# Patient Record
Sex: FEMALE | Race: WHITE | NOT HISPANIC OR LATINO | URBAN - METROPOLITAN AREA
[De-identification: names, ages, dates, MRNs, and addresses within clinical notes are randomized per-mention and may not be internally consistent; named-entity substitution may affect disease eponyms.]

---

## 2022-01-31 ENCOUNTER — OFFICE (OUTPATIENT)
Dept: URBAN - METROPOLITAN AREA CLINIC 72 | Facility: CLINIC | Age: 77
End: 2022-01-31

## 2022-01-31 VITALS
SYSTOLIC BLOOD PRESSURE: 134 MMHG | DIASTOLIC BLOOD PRESSURE: 78 MMHG | HEIGHT: 64 IN | WEIGHT: 187 LBS | HEART RATE: 68 BPM | OXYGEN SATURATION: 99 %

## 2022-01-31 DIAGNOSIS — D75.89 OTHER SPECIFIED DISEASES OF BLOOD AND BLOOD-FORMING ORGANS: ICD-10-CM

## 2022-01-31 PROCEDURE — 99213 OFFICE O/P EST LOW 20 MIN: CPT | Performed by: INTERNAL MEDICINE

## 2022-01-31 NOTE — SERVICEHPINOTES
Nalini Evans   is seen today for a follow-up visit.  
br
br76 year old female previously seen by Dr. Lafleur who I saw in the hospital for large gastric ulcerpatient was admitted 8/9 with large antral gastric ulcer. Hgb dopped to a low of 5.9. EGD performed 8/8 by Thiago with large pre-pyloric ulcer.  She had angio as well with no targets.  No endoscopic or angio treatment given.She called after the hospitalization and noted weight gain and diarrhea.  We got a stool sample for C dff that was negative.brIn terms of the excess water weight she went to see her pcp and her BP meds were adjusted to include a water pill and have improved.brIn terms of the diarrhea it is back to normal.brShe is not having pain, no black stool.brShe is taking protonix twice a day. No side effects from that medication.      br  Plan from last visit:
br
- I would recommend colonoscopy at age 77br- continue pantoprazole 40 mg twice a day for a month totalbr- then start pantoprazole 40 mg once a day which you will stay on till EGDbr- If it is healed nicely on EGD we will decrease to 20 mg once ad aybr- labs todaybr- avoid NSAIDS, I fyou need to be on it then let me know and we may have to adjust medications Interval History:  1/31/2022   
br   Follow up EGD with healing but some narrowing and small erosion. I recommended continue 40 a day for 8 weeks then decrease to 20. She is doing well, no abdominal pain. no acid reflux.  br 
Folow up labs showed neg HP, macrocytic anemia (, hgb 11) she did not follow up for folate and b12 that I ordered
br BM are normal. brMy nurse has reviewed and updated the medication list with the patient (medication reconciliation). I have also reviewed the medication list. New updates were made to the patient's medical, social and family history. Pertinent details are also noted above in the HPI.br visited="true"

## 2022-01-31 NOTE — SERVICENOTES
Our goal is to partner with you to improve your health and well being. It is important for you to complete necessary testing and follow the instructions given to you at your clinic visit. Our office will call you within 2 weeks with results of any testing but you may also call sooner to obtain results [phone].   If you have any questions or concerns please feel free to call.  We take your care very seriously and we thank you for your trust!
- I would like you to have b12 and folate drawn because of macrocytosis, you can have this done with Dr. Alvarez or if needed to do here just let us know
- you are due for your colonoscopy 5/2023, if you don't hear from us by 5/2023 please let us know
- continue the pantoprazole 20 mg a day
- , Follow up as needed if symptoms are not improving or you have new or concerning symptoms.

## 2022-10-13 ENCOUNTER — OFFICE (OUTPATIENT)
Dept: URBAN - METROPOLITAN AREA CLINIC 72 | Facility: CLINIC | Age: 77
End: 2022-10-13

## 2022-10-13 VITALS
SYSTOLIC BLOOD PRESSURE: 122 MMHG | HEIGHT: 64 IN | WEIGHT: 188 LBS | HEART RATE: 75 BPM | OXYGEN SATURATION: 96 % | DIASTOLIC BLOOD PRESSURE: 70 MMHG

## 2022-10-13 DIAGNOSIS — Z86.010 PERSONAL HISTORY OF COLONIC POLYPS: ICD-10-CM

## 2022-10-13 DIAGNOSIS — Z87.11 PERSONAL HISTORY OF PEPTIC ULCER DISEASE: ICD-10-CM

## 2022-10-13 DIAGNOSIS — D53.9 NUTRITIONAL ANEMIA, UNSPECIFIED: ICD-10-CM

## 2022-10-13 PROCEDURE — 99214 OFFICE O/P EST MOD 30 MIN: CPT | Performed by: INTERNAL MEDICINE

## 2022-10-13 NOTE — SERVICENOTES
Our goal is to partner with you to improve your health and well being. It is important for you to complete necessary testing and follow the instructions given to you at your clinic visit. Our office will call you within 2 weeks with results of any testing but you may also call sooner to obtain results [phone].   If you have any questions or concerns please feel free to call.  We take your care very seriously and we thank you for your trust!
- get labs
- we will wait list you for your colonoscopy 5/2023. no need for repeat office visit prior to that unless symptoms or medical history changes.  The office will just call you to get scheduled.  If you don't hear from us by 4/2023 please reach out.    Depending on labs we may need EGD as well (or to do it sooner)

## 2022-10-13 NOTE — SERVICEHPINOTES
Nalini Evans   is seen today for a follow-up visit.  
br
br77 year old female previously seen by Dr. Lafleur who I saw in the hospital for large gastric ulcerpatient was admitted 8/9 with large antral gastric ulcer. Hgb dopped to a low of 5.9. EGD performed 8/8 by Thiago with large pre-pyloric ulcer. She had angio as well with no targets. No endoscopic or angio treatment given.She called after the hospitalization and noted weight gain and diarrhea. We got a stool sample for C dff that was negative.brIn terms of the excess water weight she went to see her pcp and her BP meds were adjusted to include a water pill and have improved.brIn terms of the diarrhea it is back to normal.brShe is not having pain, no black stool.brShe is taking protonix twice a day. No side effects from that medication. Interval History:1/31/2022brFollow up EGD with healing but some narrowing and small erosion. I recommended continue 40 a day for 8 weeks then decrease to 20. She is doing well, no abdominal pain. no acid reflux. brFolow up labs showed neg HP, macrocytic anemia (, hgb 11) she did not follow up for folate and b12 that I orderedbrBM are normal. br    br  Plan from last visit:
br
br I would like you to have b12 and folate drawn because of macrocytosis, you can have this done with Dr. Alvarez or if needed to do here just let us knowbr- you are due for your colonoscopy 5/2023, if you don't hear from us by 5/2023 please let us knowbr- continue the pantoprazole 20 mg a daybr- , Follow up as needed if symptoms are not improving or you have new or concerning symptoms. Interval History:  10/13/2022   
br   I did nto get lab results.
br She is on pantoprazole 20 mg and doing well. 
br she feels great.  no heartburn or abdominal pain.  no blood in the stool or black stool. br She does not drink significant alcohol.  My nurse has reviewed and updated the medication list with the patient (medication reconciliation). I have also reviewed the medication list. New updates were made to the patient's medical, social and family history. Pertinent details are also noted above in the HPI.br visited="true"

## 2023-04-21 ENCOUNTER — OFFICE (OUTPATIENT)
Dept: URBAN - METROPOLITAN AREA CLINIC 72 | Facility: CLINIC | Age: 78
End: 2023-04-21

## 2023-04-21 VITALS
WEIGHT: 194 LBS | HEIGHT: 64 IN | HEART RATE: 72 BPM | OXYGEN SATURATION: 95 % | DIASTOLIC BLOOD PRESSURE: 74 MMHG | SYSTOLIC BLOOD PRESSURE: 132 MMHG

## 2023-04-21 DIAGNOSIS — R79.89 OTHER SPECIFIED ABNORMAL FINDINGS OF BLOOD CHEMISTRY: ICD-10-CM

## 2023-04-21 DIAGNOSIS — Z86.010 PERSONAL HISTORY OF COLONIC POLYPS: ICD-10-CM

## 2023-04-21 DIAGNOSIS — D53.9 NUTRITIONAL ANEMIA, UNSPECIFIED: ICD-10-CM

## 2023-04-21 PROCEDURE — 99214 OFFICE O/P EST MOD 30 MIN: CPT | Performed by: INTERNAL MEDICINE

## 2023-04-21 RX ORDER — SODIUM SULFATE, POTASSIUM SULFATE, MAGNESIUM SULFATE 17.5; 3.13; 1.6 G/ML; G/ML; G/ML
SOLUTION, CONCENTRATE ORAL
Qty: 1 | Refills: 0 | Status: ACTIVE
Start: 2023-04-21

## 2023-04-21 NOTE — SERVICEHPINOTES
Nalini Evans   is seen today for a follow-up visit.  
br
br77 year old female previously seen by Dr. Lafleur who I saw in the hospital for large gastric ulcerpatient was admitted 8/9 with large antral gastric ulcer. Hgb dopped to a low of 5.9. EGD performed 8/8 by Thiago with large pre-pyloric ulcer. She had angio as well with no targets. No endoscopic or angio treatment given.She called after the hospitalization and noted weight gain and diarrhea. We got a stool sample for C dff that was negative.brIn terms of the excess water weight she went to see her pcp and her BP meds were adjusted to include a water pill and have improved.brIn terms of the diarrhea it is back to normal.brShe is not having pain, no black stool.brShe is taking protonix twice a day. No side effects from that medication.Interval History:1/31/2022brFollow up EGD with healing but some narrowing and small erosion. I recommended continue 40 a day for 8 weeks then decrease to 20. She is doing well, no abdominal pain. no acid reflux.brFolow up labs showed neg HP, macrocytic anemia (, hgb 11) she did not follow up for folate and b12 that I orderedbrBM are normal.Plan from last visit:I would like you to have b12 and folate drawn because of macrocytosis, you can have this done with Dr. Alvarez or if needed to do here just let us knowbr- you are due for your colonoscopy 5/2023, if you don't hear from us by 5/2023 please let us knowbr- continue the pantoprazole 20 mg a daybr-, Follow up as needed if symptoms are not improving or you have new or concerning symptoms.Interval History:10/13/2022brI did nto get lab results.brShe is on pantoprazole 20 mg and doing well. brshe feels great. no heartburn or abdominal pain. no blood in the stool or black stool. brShe does not drink significant alcohol.    br  Plan from last visit:
br
br- get labsbr- we will wait list you for your colonoscopy 5/2023. no need for repeat office visit prior to that unless symptoms or medical history changes. The office will just call you to get scheduled. If you don't hear from us by 4/2023 please reach out. Depending on labs we may need EGD as well (or to do it sooner) Interval History:  4/21/2023   
Dhaval reviewed the [facility] notes.  She has eleavted homcystein and is on folic acid 
br   NO upper GI symptoms, no epigastric pain or ulcer symptoms
br no lower GI symptoms, she is due for her colonosocpy My nurse has reviewed and updated the medication list with the patient (medication reconciliation). I have also reviewed the medication list. New updates were made to the patient's medical, social and family history. Pertinent details are also noted above in the HPI.br visited="true"

## 2023-04-21 NOTE — SERVICENOTES
Our goal is to partner with you to improve your health and well being. It is important for you to complete necessary testing and follow the instructions given to you at your clinic visit. Our office will call you within 2 weeks with results of any testing but you may also call sooner to obtain results [phone].   If you have any questions or concerns please feel free to call.  We take your care very seriously and we thank you for your trust!
- , We will obtain prior colonoscopy reports and determine when you are due for your next procedure.  If you don't hear from us in 10 days about these records please contact us.

## 2023-06-07 ENCOUNTER — AMBULATORY SURGICAL CENTER (OUTPATIENT)
Dept: URBAN - METROPOLITAN AREA SURGERY 19 | Facility: SURGERY | Age: 78
End: 2023-06-07
Payer: MEDICARE

## 2023-06-07 DIAGNOSIS — Z86.010 PERSONAL HISTORY OF COLONIC POLYPS: ICD-10-CM

## 2023-06-07 PROCEDURE — G0105 COLORECTAL SCRN; HI RISK IND: HCPCS | Performed by: INTERNAL MEDICINE

## 2023-11-02 ENCOUNTER — APPOINTMENT (OUTPATIENT)
Dept: URBAN - METROPOLITAN AREA SURGERY 12 | Age: 78
Setting detail: DERMATOLOGY
End: 2023-11-02

## 2023-11-02 DIAGNOSIS — I78.8 OTHER DISEASES OF CAPILLARIES: ICD-10-CM

## 2023-11-02 DIAGNOSIS — L82.0 INFLAMED SEBORRHEIC KERATOSIS: ICD-10-CM

## 2023-11-02 PROCEDURE — OTHER MIPS QUALITY: OTHER

## 2023-11-02 PROCEDURE — OTHER LIQUID NITROGEN: OTHER

## 2023-11-02 PROCEDURE — 99202 OFFICE O/P NEW SF 15 MIN: CPT | Mod: 25

## 2023-11-02 PROCEDURE — OTHER COUNSELING: OTHER

## 2023-11-02 PROCEDURE — 17110 DESTRUCT B9 LESION 1-14: CPT

## 2023-11-02 ASSESSMENT — LOCATION ZONE DERM: LOCATION ZONE: FACE

## 2023-11-02 ASSESSMENT — LOCATION DETAILED DESCRIPTION DERM: LOCATION DETAILED: RIGHT LATERAL SUBMANDIBULAR CHEEK

## 2023-11-02 ASSESSMENT — LOCATION SIMPLE DESCRIPTION DERM: LOCATION SIMPLE: RIGHT CHEEK

## 2023-11-02 NOTE — HPI: OTHER
Condition:: Spot of concern
Please Describe Your Condition:: Pt reports spot on the right side of the neck. Area has been present for at least one year and presents with mild itching at the sight. Pt has applied OTC lotion to the area without relief of s/s.
Condition:: Area of concern
Please Describe Your Condition:: Pt reports rash that is present over the bridge of the nose and the cheeks. Pt believes it is rosacea, she has tried OTC without alleviation of appearance

## 2023-11-02 NOTE — PROCEDURE: COUNSELING
Detail Level: Detailed
Patient Specific Counseling (Will Not Stick From Patient To Patient): Discussed treatment with vbeam laser. Quoted pt $150 per treatment. Pt declined at this time.

## 2023-12-14 ENCOUNTER — APPOINTMENT (OUTPATIENT)
Dept: URBAN - METROPOLITAN AREA SURGERY 12 | Age: 78
Setting detail: DERMATOLOGY
End: 2023-12-14

## 2023-12-14 DIAGNOSIS — L82.0 INFLAMED SEBORRHEIC KERATOSIS: ICD-10-CM

## 2023-12-14 PROCEDURE — OTHER LIQUID NITROGEN: OTHER

## 2023-12-14 PROCEDURE — 17110 DESTRUCT B9 LESION 1-14: CPT

## 2023-12-14 ASSESSMENT — LOCATION DETAILED DESCRIPTION DERM: LOCATION DETAILED: RIGHT LATERAL SUBMANDIBULAR CHEEK

## 2023-12-14 ASSESSMENT — LOCATION ZONE DERM: LOCATION ZONE: FACE

## 2023-12-14 ASSESSMENT — LOCATION SIMPLE DESCRIPTION DERM: LOCATION SIMPLE: RIGHT CHEEK
